# Patient Record
Sex: MALE | Race: WHITE | NOT HISPANIC OR LATINO | Employment: UNEMPLOYED | URBAN - METROPOLITAN AREA
[De-identification: names, ages, dates, MRNs, and addresses within clinical notes are randomized per-mention and may not be internally consistent; named-entity substitution may affect disease eponyms.]

---

## 2019-04-07 ENCOUNTER — HOSPITAL ENCOUNTER (OUTPATIENT)
Dept: URGENT CARE | Facility: CLINIC | Age: 44
Discharge: HOME | End: 2019-04-07
Attending: NURSE PRACTITIONER

## 2020-01-20 ENCOUNTER — HOSPITAL ENCOUNTER (OUTPATIENT)
Dept: URGENT CARE | Facility: CLINIC | Age: 45
Discharge: HOME | End: 2020-01-20
Attending: FAMILY MEDICINE

## 2020-01-22 LAB
BACTERIA SPEC AEROBE CULT: ABNORMAL
CIPROFLOXACIN SUSC ISLT: <=0.5
CLINDAMYCIN SUSC ISLT: 0.25
DAPTOMYCIN SUSC ISLT: 0.25
DOXYCYCLINE SUSC ISLT: <=0.5
ERYTHROMYCIN SUSC ISLT: <=0.25
GENTAMICIN SUSC ISLT: <=0.5
LEVOFLOXACIN SUSC ISLT: 0.25
OXACILLIN SUSC ISLT: 0.5
RIFAMPIN SUSC ISLT: <=0.5
TETRACYCLINE SUSC ISLT: <=1
TIGECYCLINE SUSC ISLT: <=0.12
TMP SMX SUSC ISLT: <=10
VANCOMYCIN SUSC ISLT: 1

## 2020-08-09 ENCOUNTER — HOSPITAL ENCOUNTER (OUTPATIENT)
Dept: URGENT CARE | Facility: CLINIC | Age: 45
Discharge: HOME | End: 2020-08-09
Attending: NURSE PRACTITIONER

## 2020-08-11 LAB
BACTERIA SPEC AEROBE CULT: NORMAL
BACTERIA UR CULT: NORMAL

## 2021-04-05 ENCOUNTER — HOSPITAL ENCOUNTER (OUTPATIENT)
Dept: URGENT CARE | Facility: CLINIC | Age: 46
Discharge: HOME | End: 2021-04-05
Attending: FAMILY MEDICINE

## 2022-07-14 ENCOUNTER — HOSPITAL ENCOUNTER (EMERGENCY)
Facility: HOSPITAL | Age: 47
Discharge: HOME | End: 2022-07-14
Attending: EMERGENCY MEDICINE | Admitting: EMERGENCY MEDICINE

## 2022-07-14 ENCOUNTER — APPOINTMENT (OUTPATIENT)
Dept: CT IMAGING | Facility: HOSPITAL | Age: 47
End: 2022-07-14

## 2022-07-14 VITALS
DIASTOLIC BLOOD PRESSURE: 81 MMHG | TEMPERATURE: 98.2 F | SYSTOLIC BLOOD PRESSURE: 111 MMHG | HEIGHT: 67 IN | OXYGEN SATURATION: 100 % | HEART RATE: 60 BPM | BODY MASS INDEX: 25.85 KG/M2 | WEIGHT: 164.68 LBS | RESPIRATION RATE: 18 BRPM

## 2022-07-14 DIAGNOSIS — R10.9 RIGHT FLANK PAIN: Primary | ICD-10-CM

## 2022-07-14 LAB
ALBUMIN SERPL-MCNC: 4.8 G/DL (ref 3.5–5.2)
ALBUMIN/GLOB SERPL: 2 G/DL
ALP SERPL-CCNC: 110 U/L (ref 39–117)
ALT SERPL W P-5'-P-CCNC: 12 U/L (ref 1–41)
ANION GAP SERPL CALCULATED.3IONS-SCNC: 10.9 MMOL/L (ref 5–15)
AST SERPL-CCNC: 16 U/L (ref 1–40)
BACTERIA UR QL AUTO: ABNORMAL /HPF
BASOPHILS # BLD AUTO: 0.07 10*3/MM3 (ref 0–0.2)
BASOPHILS NFR BLD AUTO: 0.4 % (ref 0–1.5)
BILIRUB SERPL-MCNC: 0.8 MG/DL (ref 0–1.2)
BILIRUB UR QL STRIP: NEGATIVE
BUN SERPL-MCNC: 12 MG/DL (ref 6–20)
BUN/CREAT SERPL: 10.5 (ref 7–25)
CALCIUM SPEC-SCNC: 9.6 MG/DL (ref 8.6–10.5)
CHLORIDE SERPL-SCNC: 102 MMOL/L (ref 98–107)
CLARITY UR: CLEAR
CO2 SERPL-SCNC: 21.1 MMOL/L (ref 22–29)
COLOR UR: YELLOW
CREAT SERPL-MCNC: 1.14 MG/DL (ref 0.76–1.27)
DEPRECATED RDW RBC AUTO: 39.5 FL (ref 37–54)
EGFRCR SERPLBLD CKD-EPI 2021: 80.8 ML/MIN/1.73
EOSINOPHIL # BLD AUTO: 0.15 10*3/MM3 (ref 0–0.4)
EOSINOPHIL NFR BLD AUTO: 0.9 % (ref 0.3–6.2)
ERYTHROCYTE [DISTWIDTH] IN BLOOD BY AUTOMATED COUNT: 12.9 % (ref 12.3–15.4)
GLOBULIN UR ELPH-MCNC: 2.4 GM/DL
GLUCOSE SERPL-MCNC: 115 MG/DL (ref 65–99)
GLUCOSE UR STRIP-MCNC: NEGATIVE MG/DL
HCT VFR BLD AUTO: 39.7 % (ref 37.5–51)
HGB BLD-MCNC: 14.3 G/DL (ref 13–17.7)
HGB UR QL STRIP.AUTO: ABNORMAL
HOLD SPECIMEN: NORMAL
HOLD SPECIMEN: NORMAL
HYALINE CASTS UR QL AUTO: ABNORMAL /LPF
IMM GRANULOCYTES # BLD AUTO: 0.07 10*3/MM3 (ref 0–0.05)
IMM GRANULOCYTES NFR BLD AUTO: 0.4 % (ref 0–0.5)
KETONES UR QL STRIP: NEGATIVE
LEUKOCYTE ESTERASE UR QL STRIP.AUTO: NEGATIVE
LIPASE SERPL-CCNC: 15 U/L (ref 13–60)
LYMPHOCYTES # BLD AUTO: 2.29 10*3/MM3 (ref 0.7–3.1)
LYMPHOCYTES NFR BLD AUTO: 14.1 % (ref 19.6–45.3)
MCH RBC QN AUTO: 30.6 PG (ref 26.6–33)
MCHC RBC AUTO-ENTMCNC: 36 G/DL (ref 31.5–35.7)
MCV RBC AUTO: 84.8 FL (ref 79–97)
MONOCYTES # BLD AUTO: 1.32 10*3/MM3 (ref 0.1–0.9)
MONOCYTES NFR BLD AUTO: 8.1 % (ref 5–12)
NEUTROPHILS NFR BLD AUTO: 12.33 10*3/MM3 (ref 1.7–7)
NEUTROPHILS NFR BLD AUTO: 76.1 % (ref 42.7–76)
NITRITE UR QL STRIP: NEGATIVE
NRBC BLD AUTO-RTO: 0 /100 WBC (ref 0–0.2)
PH UR STRIP.AUTO: 6 [PH] (ref 5–8)
PLATELET # BLD AUTO: 246 10*3/MM3 (ref 140–450)
PMV BLD AUTO: 9.3 FL (ref 6–12)
POTASSIUM SERPL-SCNC: 3.9 MMOL/L (ref 3.5–5.2)
PROT SERPL-MCNC: 7.2 G/DL (ref 6–8.5)
PROT UR QL STRIP: NEGATIVE
RBC # BLD AUTO: 4.68 10*6/MM3 (ref 4.14–5.8)
RBC # UR STRIP: ABNORMAL /HPF
REF LAB TEST METHOD: ABNORMAL
SODIUM SERPL-SCNC: 134 MMOL/L (ref 136–145)
SP GR UR STRIP: 1.01 (ref 1–1.03)
SQUAMOUS #/AREA URNS HPF: ABNORMAL /HPF
UROBILINOGEN UR QL STRIP: ABNORMAL
WBC # UR STRIP: ABNORMAL /HPF
WBC NRBC COR # BLD: 16.23 10*3/MM3 (ref 3.4–10.8)
WHOLE BLOOD HOLD COAG: NORMAL
WHOLE BLOOD HOLD SPECIMEN: NORMAL

## 2022-07-14 PROCEDURE — 85025 COMPLETE CBC W/AUTO DIFF WBC: CPT | Performed by: EMERGENCY MEDICINE

## 2022-07-14 PROCEDURE — 25010000002 KETOROLAC TROMETHAMINE PER 15 MG: Performed by: EMERGENCY MEDICINE

## 2022-07-14 PROCEDURE — 83690 ASSAY OF LIPASE: CPT | Performed by: EMERGENCY MEDICINE

## 2022-07-14 PROCEDURE — 99283 EMERGENCY DEPT VISIT LOW MDM: CPT

## 2022-07-14 PROCEDURE — 96374 THER/PROPH/DIAG INJ IV PUSH: CPT

## 2022-07-14 PROCEDURE — 80053 COMPREHEN METABOLIC PANEL: CPT | Performed by: EMERGENCY MEDICINE

## 2022-07-14 PROCEDURE — 74177 CT ABD & PELVIS W/CONTRAST: CPT

## 2022-07-14 PROCEDURE — 0 IOPAMIDOL PER 1 ML: Performed by: EMERGENCY MEDICINE

## 2022-07-14 PROCEDURE — 36415 COLL VENOUS BLD VENIPUNCTURE: CPT

## 2022-07-14 PROCEDURE — 81001 URINALYSIS AUTO W/SCOPE: CPT | Performed by: EMERGENCY MEDICINE

## 2022-07-14 RX ORDER — DICLOFENAC SODIUM 75 MG/1
75 TABLET, DELAYED RELEASE ORAL 2 TIMES DAILY PRN
Qty: 20 TABLET | Refills: 0 | Status: SHIPPED | OUTPATIENT
Start: 2022-07-14

## 2022-07-14 RX ORDER — KETOROLAC TROMETHAMINE 15 MG/ML
30 INJECTION, SOLUTION INTRAMUSCULAR; INTRAVENOUS ONCE
Status: COMPLETED | OUTPATIENT
Start: 2022-07-14 | End: 2022-07-14

## 2022-07-14 RX ORDER — SODIUM CHLORIDE 0.9 % (FLUSH) 0.9 %
10 SYRINGE (ML) INJECTION AS NEEDED
Status: DISCONTINUED | OUTPATIENT
Start: 2022-07-14 | End: 2022-07-14 | Stop reason: HOSPADM

## 2022-07-14 RX ADMIN — IOPAMIDOL 100 ML: 755 INJECTION, SOLUTION INTRAVENOUS at 19:14

## 2022-07-14 RX ADMIN — KETOROLAC TROMETHAMINE 30 MG: 15 INJECTION, SOLUTION INTRAMUSCULAR; INTRAVENOUS at 17:22

## 2022-07-14 NOTE — ED PROVIDER NOTES
Time: 5:05 PM EDT  Arrived by: private car  Chief Complaint: right flank pain  History provided by: patient  History is limited by: N/A     History of Present Illness:  Patient is a 46 y.o.  male that presents to the emergency department with complaints of right flank pain    Pt referred here by Urgent care for complaints of right flank pain. Relieved by change in position, nothing worsens pain. Denies nausea, vomiting. Pt admits to similar symptoms before when he had kidney stones. Pt is able to tolerate food and drinks.      History provided by:  Patient   used: No    Flank Pain  Pain location:  R flank  Pain quality: sharp    Pain radiates to:  Does not radiate  Pain severity:  Moderate  Onset quality:  Gradual  Timing:  Intermittent  Chronicity:  Chronic  Relieved by:  Position changes  Worsened by:  Nothing  Associated symptoms: no chest pain, no chills, no cough, no diarrhea, no fever, no hematuria, no nausea, no shortness of breath, no sore throat and no vomiting    Risk factors comment:  Hx of kidney stones      Patient Care Team  Primary Care Provider: Provider, No Known    Past Medical History:     No Known Allergies  History reviewed. No pertinent past medical history.  History reviewed. No pertinent surgical history.  History reviewed. No pertinent family history.    Home Medications:  Prior to Admission medications    Not on File        Social History:   Social History     Tobacco Use   • Smoking status: Current Every Day Smoker     Packs/day: 0.50     Years: 20.00     Pack years: 10.00     Types: Cigarettes   • Smokeless tobacco: Never Used   Vaping Use   • Vaping Use: Never used   Substance Use Topics   • Alcohol use: Never   • Drug use: Never       Review of Systems:  Review of Systems   Constitutional: Negative for chills and fever.   HENT: Negative for congestion, ear pain and sore throat.    Eyes: Negative for pain.   Respiratory: Negative for cough, chest tightness and  "shortness of breath.    Cardiovascular: Negative for chest pain.   Gastrointestinal: Negative for abdominal pain, diarrhea, nausea and vomiting.   Genitourinary: Positive for flank pain. Negative for hematuria.   Musculoskeletal: Negative for joint swelling.   Skin: Negative for pallor.   Neurological: Negative for seizures and headaches.   All other systems reviewed and are negative.         Physical Exam:  /74   Pulse 65   Temp 98.2 °F (36.8 °C) (Oral)   Resp 18   Ht 170.2 cm (67\")   Wt 74.7 kg (164 lb 10.9 oz)   SpO2 94%   BMI 25.79 kg/m²     Physical Exam  Vitals and nursing note reviewed.   Constitutional:       General: He is not in acute distress.     Appearance: Normal appearance. He is not toxic-appearing.   HENT:      Head: Normocephalic and atraumatic.      Mouth/Throat:      Mouth: Mucous membranes are moist.   Eyes:      General: No scleral icterus.  Cardiovascular:      Rate and Rhythm: Normal rate and regular rhythm.      Pulses: Normal pulses.      Heart sounds: Normal heart sounds.   Pulmonary:      Effort: Pulmonary effort is normal. No respiratory distress.      Breath sounds: Normal breath sounds.   Abdominal:      General: Abdomen is flat.      Palpations: Abdomen is soft.      Tenderness: There is no abdominal tenderness.      Comments: Right flank pain, no radiation   Musculoskeletal:         General: Normal range of motion.      Cervical back: Normal range of motion and neck supple.      Comments: No CVA tenderness   Skin:     General: Skin is warm and dry.   Neurological:      Mental Status: He is alert and oriented to person, place, and time. Mental status is at baseline.                Medications in the Emergency Department:  Medications   sodium chloride 0.9 % flush 10 mL (has no administration in time range)   ketorolac (TORADOL) injection 30 mg (30 mg Intravenous Given 7/14/22 1722)   iopamidol (ISOVUE-370) 76 % injection 100 mL (100 mL Intravenous Given 7/14/22 1914)    "     Labs  Lab Results (last 24 hours)     Procedure Component Value Units Date/Time    POC Urinalysis Dipstick, Multipro (Automated Dipstick) [[id number]]  (Abnormal) Collected: 07/14/22 1504    Specimen: Urine Updated: 07/14/22 1505     Color Dark Yellow     Clarity, UA Slightly Cloudy     Glucose, UA Negative mg/dL      Bilirubin Negative     Ketones, UA Negative     Specific Gravity  1.030     Comment: greater than        Blood, UA Trace     pH, Urine 5.5     Protein, POC 30 mg/dL mg/dL      Urobilinogen, UA Normal     Nitrite, UA Negative     Leukocytes Negative    CBC & Differential [[id number]]  (Abnormal) Collected: 07/14/22 1627    Specimen: Blood Updated: 07/14/22 1643    Narrative:      The following orders were created for panel order CBC & Differential.  Procedure                               Abnormality         Status                     ---------                               -----------         ------                     CBC Auto Differential[[id number]]        Abnormal            Final result                 Please view results for these tests on the individual orders.    Comprehensive Metabolic Panel [[id number]]  (Abnormal) Collected: 07/14/22 1627    Specimen: Blood Updated: 07/14/22 1707     Glucose 115 mg/dL      BUN 12 mg/dL      Creatinine 1.14 mg/dL      Sodium 134 mmol/L      Potassium 3.9 mmol/L      Chloride 102 mmol/L      CO2 21.1 mmol/L      Calcium 9.6 mg/dL      Total Protein 7.2 g/dL      Albumin 4.80 g/dL      ALT (SGPT) 12 U/L      AST (SGOT) 16 U/L      Alkaline Phosphatase 110 U/L      Total Bilirubin 0.8 mg/dL      Globulin 2.4 gm/dL      A/G Ratio 2.0 g/dL      BUN/Creatinine Ratio 10.5     Anion Gap 10.9 mmol/L      eGFR 80.8 mL/min/1.73      Comment: National Kidney Foundation and American Society of Nephrology (ASN) Task Force recommended calculation based on the Chronic Kidney Disease Epidemiology Collaboration (CKD-EPI) equation refit without adjustment for race.        Narrative:      GFR Normal >60  Chronic Kidney Disease <60  Kidney Failure <15      Lipase [[id number]]  (Normal) Collected: 07/14/22 1627    Specimen: Blood Updated: 07/14/22 1707     Lipase 15 U/L     CBC Auto Differential [[id number]]  (Abnormal) Collected: 07/14/22 1627    Specimen: Blood Updated: 07/14/22 1643     WBC 16.23 10*3/mm3      RBC 4.68 10*6/mm3      Hemoglobin 14.3 g/dL      Hematocrit 39.7 %      MCV 84.8 fL      MCH 30.6 pg      MCHC 36.0 g/dL      RDW 12.9 %      RDW-SD 39.5 fl      MPV 9.3 fL      Platelets 246 10*3/mm3      Neutrophil % 76.1 %      Lymphocyte % 14.1 %      Monocyte % 8.1 %      Eosinophil % 0.9 %      Basophil % 0.4 %      Immature Grans % 0.4 %      Neutrophils, Absolute 12.33 10*3/mm3      Lymphocytes, Absolute 2.29 10*3/mm3      Monocytes, Absolute 1.32 10*3/mm3      Eosinophils, Absolute 0.15 10*3/mm3      Basophils, Absolute 0.07 10*3/mm3      Immature Grans, Absolute 0.07 10*3/mm3      nRBC 0.0 /100 WBC     Urinalysis With Microscopic If Indicated (No Culture) - Urine, Clean Catch [[id number]]  (Abnormal) Collected: 07/14/22 1635    Specimen: Urine, Clean Catch Updated: 07/14/22 1649     Color, UA Yellow     Appearance, UA Clear     pH, UA 6.0     Specific Gravity, UA 1.006     Glucose, UA Negative     Ketones, UA Negative     Bilirubin, UA Negative     Blood, UA Trace     Protein, UA Negative     Leuk Esterase, UA Negative     Nitrite, UA Negative     Urobilinogen, UA 1.0 E.U./dL    Urinalysis, Microscopic Only - Urine, Clean Catch [[id number]]  (Abnormal) Collected: 07/14/22 1635    Specimen: Urine, Clean Catch Updated: 07/14/22 1649     RBC, UA 0-2 /HPF      WBC, UA 0-2 /HPF      Bacteria, UA None Seen /HPF      Squamous Epithelial Cells, UA 0-2 /HPF      Hyaline Casts, UA 0-2 /LPF      Methodology Automated Microscopy           Imaging:  CT Abdomen Pelvis With Contrast    Result Date: 7/14/2022  PROCEDURE: CT ABDOMEN PELVIS W CONTRAST  COMPARISON: [facility],  CT, ABD/PELVIS STONE PROTOCOL W/O, 3/15/2007, 15:26.  INDICATIONS: Right flank pain and leukocytosis  TECHNIQUE: After obtaining the patient's consent, CT images were created with non-ionic intravenous contrast material.   PROTOCOL:   Standard imaging protocol performed    RADIATION:   DLP: 426.3mGy*cm   Automated exposure control was utilized to minimize radiation dose. CONTRAST: 100cc Isovue 370 I.V.  FINDINGS:  Within the lung bases is emphysema.  The liver, gallbladder, adrenal glands, right kidney, spleen, and pancreas are unremarkable.  There is a small left renal cyst.  The stomach appears normal.  The small bowel appears normal in caliber and configuration.  The colon appears normal.  The appendix appears normal.  There is no ascites or loculated collection.  No abnormally enlarged lymph nodes are identified.  The rectum, prostate, and urinary bladder are unremarkable.  No aggressive osseous lesions are identified.       No acute process identified within abdomen/pelvis.     FAUSTO ROBERTSON MD       Electronically Signed and Approved By: FAUSTO ROBERTSON MD on 7/14/2022 at 19:46                EKG:      Procedures:  Procedures    Progress                      The patient was seen and evaluated the ED by me.  The above history and physical examination was performed as document.  The diagnostic data was obtained peer results reviewed.  Despite having a leukocytosis there is no source for this.  Patient does report that his pain is improved after medication of Toradol was given to him.  I feel his pain at this time is likely muscle skeletal in nature.  Patient placed on NSAIDs.  I did discuss with him and his wife that if his symptoms were to change or worsen he is to return to the ER for repeat evaluation.  Patient verbalizes understanding.      Medical Decision Making:  MDM  Number of Diagnoses or Management Options     Amount and/or Complexity of Data Reviewed  Review and summarize past medical records:  yes    Patient Progress  Patient progress: (20:24 EDT Updated patient on results and plan. Patient expressed understanding and agreement. All questions answered at this time. )       Final diagnoses:   Right flank pain        Disposition:  ED Disposition     ED Disposition   Discharge    Condition   Stable    Comment   --              Cantu, Anna C  07/14/22 1710       Cantu, Anna C  07/14/22 2024       Clark Pruitt DO  07/14/22 2104

## 2022-07-15 NOTE — DISCHARGE INSTRUCTIONS
Avoid any strenuous activities.  Take the prescription as prescribed for pain.  Return to the ER for development of fever greater than 101, vomiting, blood in the urine, or any other concerns issues that may arise.   yes